# Patient Record
Sex: MALE | Employment: OTHER | URBAN - METROPOLITAN AREA
[De-identification: names, ages, dates, MRNs, and addresses within clinical notes are randomized per-mention and may not be internally consistent; named-entity substitution may affect disease eponyms.]

---

## 2019-12-16 NOTE — PATIENT DISCUSSION
MAP DOT FINGERPRINT OD: PRESCRIBE ARTIFICIAL TEARS FOR COMFORT. CONSIDER CORNEAL CONSULT IF CHANCES OCCUR.

## 2019-12-16 NOTE — PATIENT DISCUSSION
GLAUCOMA SUSPECT, OU :+FAMILY HISTORY:  INTRAOCULAR PRESSURE IS WITHIN ACCEPTABLE LIMITS. GONIO DONE TODAY TO DOCUMENT HISTORY. RNFL OCT HAS POSSIBLE CHANGES PROCEED WITH FURTHER TESTING.

## 2020-01-31 NOTE — PATIENT DISCUSSION
GLAUCOMA SUSPECT, OU : INTRAOCULAR PRESSURE AND HVF IS WITHIN ACCEPTABLE LIMITS. THICK PACHS. NO DROP THERAPY NEEDED AT THIS TIME.

## 2022-05-12 ENCOUNTER — PREPPED CHART (OUTPATIENT)
Dept: URBAN - METROPOLITAN AREA CLINIC 13 | Facility: CLINIC | Age: 37
End: 2022-05-12

## 2022-07-01 RX ORDER — DARUNAVIR 800 MG/1
TABLET, FILM COATED ORAL
COMMUNITY

## 2022-07-01 RX ORDER — CARVEDILOL 6.25 MG/1
TABLET ORAL
COMMUNITY

## 2022-07-01 RX ORDER — ALBUTEROL SULFATE 90 UG/1
AEROSOL, METERED RESPIRATORY (INHALATION)
COMMUNITY

## 2022-07-01 RX ORDER — TENOFOVIR DISOPROXIL FUMARATE 300 MG/1
TABLET, FILM COATED ORAL
COMMUNITY

## 2022-07-01 RX ORDER — AMLODIPINE BESYLATE 10 MG/1
TABLET ORAL
COMMUNITY

## 2022-07-01 RX ORDER — RITONAVIR 100 MG/1
TABLET ORAL
COMMUNITY

## 2023-10-27 NOTE — PATIENT DISCUSSION
NO DROP THERAPY NEEDED AT THIS TIME. Past Medical History:   Diagnosis Date    Aortic stenosis     Chronic anemia     COPD (chronic obstructive pulmonary disease)     Hypertension     Rheumatoid arthritis        History reviewed. No pertinent surgical history.    Review of patient's allergies indicates:  No Known Allergies    Family History       Problem Relation (Age of Onset)    No Known Problems Mother, Father          Tobacco Use    Smoking status: Never    Smokeless tobacco: Never   Substance and Sexual Activity    Alcohol use: Never    Drug use: Never    Sexual activity: Not Currently         Review of Systems   All other systems reviewed and are negative.    Objective:     Vital Signs (Most Recent):  Temp: 98.5 °F (36.9 °C) (10/27/23 1509)  Pulse: 108 (10/27/23 1701)  Resp: (!) 28 (10/27/23 1701)  BP: (!) 107/57 (10/27/23 1701)  SpO2: 100 % (10/27/23 1701) Vital Signs (24h Range):  Temp:  [97.8 °F (36.6 °C)-99.7 °F (37.6 °C)] 98.5 °F (36.9 °C)  Pulse:  [] 108  Resp:  [18-28] 28  SpO2:  [96 %-100 %] 100 %  BP: ()/(50-63) 107/57     Weight: 63.1 kg (139 lb 1.8 oz)  Body mass index is 19.4 kg/m².      Intake/Output Summary (Last 24 hours) at 10/27/2023 1804  Last data filed at 10/27/2023 1700  Gross per 24 hour   Intake 1983.71 ml   Output 100 ml   Net 1883.71 ml        Physical Exam  Vitals and nursing note reviewed.   Constitutional:       General: He is not in acute distress.     Appearance: He is ill-appearing (chronically).   HENT:      Head: Normocephalic and atraumatic.   Eyes:      General: No scleral icterus.     Extraocular Movements: Extraocular movements intact.      Conjunctiva/sclera: Conjunctivae normal.      Pupils: Pupils are equal, round, and reactive to light.   Cardiovascular:      Rate and Rhythm: Regular rhythm. Tachycardia present.      Pulses: Normal pulses.      Heart sounds: No murmur heard.  Pulmonary:      Effort: Pulmonary effort is normal. No respiratory distress.      Breath sounds: No wheezing, rhonchi or  rales.   Abdominal:      General: Abdomen is flat. There is no distension.      Palpations: Abdomen is soft.      Tenderness: There is abdominal tenderness (mild diffuse). There is no guarding.   Musculoskeletal:      Cervical back: Normal range of motion and neck supple. No rigidity or tenderness.      Right lower leg: No edema.      Left lower leg: No edema.   Skin:     Coloration: Skin is pale.   Neurological:      General: No focal deficit present.      Mental Status: He is alert and oriented to person, place, and time. Mental status is at baseline.          Vents:  Oxygen Concentration (%): 28 (10/26/23 1551)    Lines/Drains/Airways       Peripheral Intravenous Line  Duration                  Peripheral IV - Single Lumen 10/25/23 1659 20 G Left Antecubital 2 days         Midline Catheter Insertion/Assessment  - Single Lumen 10/27/23 1650 Left basilic vein (medial side of arm) <1 day                    Significant Labs:    CBC/Anemia Profile:  Recent Labs   Lab 10/26/23  0537 10/26/23  2027 10/27/23  0852   WBC 15.82*  --   --    HGB 5.0* 8.3* 6.9*   HCT 16.0* 24.8* 21.3*     --   --    MCV 95  --   --    RDW 17.4*  --   --         Chemistries:  Recent Labs   Lab 10/26/23  0642      K 4.3      CO2 18*   BUN 35*   CREATININE 1.1   CALCIUM 7.6*   ALBUMIN 2.1*   PROT 4.7*   BILITOT 0.5   ALKPHOS 46*   ALT 78*   AST 58*       All pertinent labs within the past 24 hours have been reviewed.    Significant Imaging:   I have reviewed all pertinent imaging results/findings within the past 24 hours.